# Patient Record
Sex: MALE | Race: WHITE
[De-identification: names, ages, dates, MRNs, and addresses within clinical notes are randomized per-mention and may not be internally consistent; named-entity substitution may affect disease eponyms.]

---

## 2023-04-19 ENCOUNTER — HOSPITAL ENCOUNTER (OUTPATIENT)
Dept: HOSPITAL 65 - RAD | Age: 38
Discharge: HOME | End: 2023-04-19
Attending: NURSE PRACTITIONER
Payer: COMMERCIAL

## 2023-04-19 DIAGNOSIS — Z00.00: Primary | ICD-10-CM

## 2023-04-19 PROCEDURE — 70220 X-RAY EXAM OF SINUSES: CPT

## 2023-04-19 NOTE — DIREP
PROCEDURE:XRAY SINUSES PARANASAL<3 VWS

 

COMPARISON:None.

 

INDICATIONS:Z00.00 HISTORY AND PHYSICAL EVALUATION, sinusitis

 

TECHNIQUE: Three views of the sinuses were performed.

 

FINDINGS:

MAXILLARY:Normal. No mucosal thickening or fluid level. 

ETHMOID:Normal. No mucosal thickening or fluid level. 

FRONTAL:Normal. No mucosal thickening or fluid level. 

SPHENOID:Normal. No mucosal thickening or fluid level. 

OTHER:Negative. 

 

CONCLUSION:The paranasal sinuses are clear.

 

 

Dictated by: Kody Prakash M.D. on 04/19/2023 at 10:48 AM 

Electronically Signed By: Kody Prakash M.D. on 04/19/2023 at 10:49 AM